# Patient Record
Sex: FEMALE | Race: WHITE | NOT HISPANIC OR LATINO | URBAN - METROPOLITAN AREA
[De-identification: names, ages, dates, MRNs, and addresses within clinical notes are randomized per-mention and may not be internally consistent; named-entity substitution may affect disease eponyms.]

---

## 2018-07-02 ENCOUNTER — IMPORTED ENCOUNTER (OUTPATIENT)
Dept: URBAN - METROPOLITAN AREA CLINIC 50 | Facility: CLINIC | Age: 73
End: 2018-07-02

## 2018-07-05 ENCOUNTER — IMPORTED ENCOUNTER (OUTPATIENT)
Dept: URBAN - METROPOLITAN AREA CLINIC 50 | Facility: CLINIC | Age: 73
End: 2018-07-05

## 2018-07-09 ENCOUNTER — IMPORTED ENCOUNTER (OUTPATIENT)
Dept: URBAN - METROPOLITAN AREA CLINIC 50 | Facility: CLINIC | Age: 73
End: 2018-07-09

## 2018-08-07 ENCOUNTER — IMPORTED ENCOUNTER (OUTPATIENT)
Dept: URBAN - METROPOLITAN AREA CLINIC 50 | Facility: CLINIC | Age: 73
End: 2018-08-07

## 2018-08-09 ENCOUNTER — IMPORTED ENCOUNTER (OUTPATIENT)
Dept: URBAN - METROPOLITAN AREA CLINIC 50 | Facility: CLINIC | Age: 73
End: 2018-08-09

## 2018-08-14 ENCOUNTER — IMPORTED ENCOUNTER (OUTPATIENT)
Dept: URBAN - METROPOLITAN AREA CLINIC 50 | Facility: CLINIC | Age: 73
End: 2018-08-14

## 2018-08-14 NOTE — PATIENT DISCUSSION
"""S/P IOL OD: Tecnis [id number] +25.0 @ 57Âº (Target: -0.25) +ORA/Femto/Arcs +TM. Continue post operative instructions and drops per schedule.  """

## 2018-08-23 ENCOUNTER — IMPORTED ENCOUNTER (OUTPATIENT)
Dept: URBAN - METROPOLITAN AREA CLINIC 50 | Facility: CLINIC | Age: 73
End: 2018-08-23

## 2018-08-23 NOTE — PATIENT DISCUSSION
"""S/P IOL OD: Tecnis [id number] +25.0 @ 57Âº (Target: -0.25) +ORA/Femto/Arcs +TM. Continue post operative instructions and drops per schedule.  """

## 2018-09-17 ENCOUNTER — IMPORTED ENCOUNTER (OUTPATIENT)
Dept: URBAN - METROPOLITAN AREA CLINIC 50 | Facility: CLINIC | Age: 73
End: 2018-09-17

## 2018-09-17 NOTE — PATIENT DISCUSSION
"""S/P IOL OD: Tecnis [id number] +25.0 @ 57Âº (Target: -0.25) +ORA/Femto/Arcs +TM. Continue post operative instructions and drops per schedule.  """

## 2018-09-18 ENCOUNTER — IMPORTED ENCOUNTER (OUTPATIENT)
Dept: URBAN - METROPOLITAN AREA CLINIC 50 | Facility: CLINIC | Age: 73
End: 2018-09-18

## 2018-10-04 ENCOUNTER — IMPORTED ENCOUNTER (OUTPATIENT)
Dept: URBAN - METROPOLITAN AREA CLINIC 50 | Facility: CLINIC | Age: 73
End: 2018-10-04

## 2018-10-04 NOTE — PATIENT DISCUSSION
"""S/P IOL OD: Tecnis [id number] +25.0 @ 57Âº (Target: -0.25) +ORA/Femto/Arcs +TM. Continue post operative instructions and drops per schedule.  """

## 2018-10-09 ENCOUNTER — IMPORTED ENCOUNTER (OUTPATIENT)
Dept: URBAN - METROPOLITAN AREA CLINIC 50 | Facility: CLINIC | Age: 73
End: 2018-10-09

## 2018-10-09 NOTE — PATIENT DISCUSSION
"""S/P IOL OS: Tecnis [id number] +25.0 (Target: -0.25) +Femto/Arcs +Omidria. Continue post operative instructions and drops per schedule.  """

## 2018-10-10 ENCOUNTER — IMPORTED ENCOUNTER (OUTPATIENT)
Dept: URBAN - METROPOLITAN AREA CLINIC 50 | Facility: CLINIC | Age: 73
End: 2018-10-10

## 2018-10-11 ENCOUNTER — IMPORTED ENCOUNTER (OUTPATIENT)
Dept: URBAN - METROPOLITAN AREA CLINIC 50 | Facility: CLINIC | Age: 73
End: 2018-10-11

## 2018-10-19 ENCOUNTER — IMPORTED ENCOUNTER (OUTPATIENT)
Dept: URBAN - METROPOLITAN AREA CLINIC 50 | Facility: CLINIC | Age: 73
End: 2018-10-19

## 2018-11-08 ENCOUNTER — IMPORTED ENCOUNTER (OUTPATIENT)
Dept: URBAN - METROPOLITAN AREA CLINIC 50 | Facility: CLINIC | Age: 73
End: 2018-11-08

## 2019-03-15 ENCOUNTER — IMPORTED ENCOUNTER (OUTPATIENT)
Dept: URBAN - METROPOLITAN AREA CLINIC 50 | Facility: CLINIC | Age: 74
End: 2019-03-15

## 2019-03-15 NOTE — PATIENT DISCUSSION
"""Follow dry ARMD without treatment. MVI/AREDS/Amsler. Patient to call if vision changes or distortion increases.  Good diet/do not smoke."" ""Start AREDS 2 vitamins ."""

## 2019-07-19 ENCOUNTER — IMPORTED ENCOUNTER (OUTPATIENT)
Dept: URBAN - METROPOLITAN AREA CLINIC 50 | Facility: CLINIC | Age: 74
End: 2019-07-19

## 2020-08-24 ENCOUNTER — IMPORTED ENCOUNTER (OUTPATIENT)
Dept: URBAN - METROPOLITAN AREA CLINIC 50 | Facility: CLINIC | Age: 75
End: 2020-08-24

## 2020-08-24 NOTE — PATIENT DISCUSSION
"""Follow dry ARMD without treatment. MVI/AREDS/Moshe. Patient to call if vision changes or distortion increases.  Good diet/do not smoke."" ""OCT Macula: OD: Stable

## 2021-02-09 NOTE — PATIENT DISCUSSION
Surgery Counseling: I have discussed the option of glasses versus cataract surgery versus following. It was explained that when the patients vision no longer meets their visual needs and a glasses prescription does not improve visual symptoms, the option of cataract surgery is a reasonable next step. After this discussion, the patient desires to consult with Dr. Francine Rodriguez for possible cataract surgery.

## 2021-02-09 NOTE — PATIENT DISCUSSION
REFRACTIVE ERROR, OU - DISCUSSED OPTION OF CORRECTING AT THE TIME OF CATARACT SURGERY. PT UNDERSTANDS AND ELECTS TO CONSIDER THEIR OPTIONS. FOLLOW UP WITH DR. GREEN FOR TESTING AND SURGERY PLANNING.

## 2021-02-09 NOTE — PATIENT DISCUSSION
The RxSight Light Adjustable Lens (RxLAL) is similar to other intraocular lenses (IOLs) that can be implanted in the eye to replace the natural lens that is removed during cataract surgery. While all IOLs improve vision after cataract surgery, most patients will require glasses (or contact lenses) to improve their vision to the level required for driving or reading. The RxLAL reduces the need for glasses or contact lenses by being able to change its focusing power after it is implanted in the eye. The focusing power of the RxLAL is adjusted by specific patterns of ultraviolet (UV) light produced by the RxSight Light Delivery Device (LDD), an instrument that the doctor uses in the office beginning 2-3 weeks after cataract surgery. Up to three light adjustment treatments can be performed to improve the patients vision, with a separation of 3 days between treatments. When the patient and doctor are satisfied with the vision, the same LDD is used to lockin the RxLAL and make the prescription permanent. From immediately after surgery until 24 hours after the completion of the lockin treatment, it was discussed that the need to protect the RxLAL from UV light in the environment by wearing ALISHA specific protective eyewear provided during all waking hours.

## 2021-02-09 NOTE — PATIENT DISCUSSION
[phone]&nbsp; &nbsp; &nbsp; alr

## 2021-02-09 NOTE — PATIENT DISCUSSION
DUE TO MATURE CATARACT OS, DISCUSSED BENEFIT FROM ALISHA DUE TO FLEXIBILITY OF ADJUSTING THE IOL UP TO 3 TIMES AFTER SURGERY. PATIENT UNDERSTANDS THE NEED FOR FULL TIME UV PROTECTIVE GLASSES UNTIL 24 HOURS AFTER THE IOL IS LOCKED.

## 2021-02-09 NOTE — PATIENT DISCUSSION
Continue: prednisol ace-gatiflox-bromfen (prednisol ace-gatiflox-bromfen): drops,suspension: 1-0.5-0.075% 1 drop three times a day [facility]

## 2021-02-16 NOTE — PATIENT DISCUSSION
Continue: prednisol ace-gatiflox-bromfen (prednisol ace-gatiflox-bromfen): drops,suspension: 1-0.5-0.075% 1 drop three times a day as directed [facility]

## 2021-02-16 NOTE — PATIENT DISCUSSION
Surgery Counseling: I have discussed the option of glasses versus cataract surgery versus following. It was explained that when the patients vision no longer meets their visual needs and a glasses prescription does not improve visual symptoms, the option of cataract surgery is a reasonable next step. It was explained that there is no guarantee that removing the cataract will improve their visual symptoms, however; it is believed that the cataract is contributing to the patient's visual impairment and surgery may improve both the visual and functional status of the patient. The risks, benefits and alternatives of surgery were discussed with the patient. After this discussion, the patient desires to proceed with cataract surgery with implantation of an intraocular lens to improve vision .

## 2021-02-16 NOTE — PATIENT DISCUSSION
DISCUSSED BENEFIT FROM ALISHA DUE TO FLEXIBILITY OF ADJUSTING THE IOL UP TO 3 TIMES AFTER SURGERY. PATIENT UNDERSTANDS THE NEED FOR FULL TIME UV PROTECTIVE GLASSES UNTIL 24 HOURS AFTER THE IOL IS LOCKED.

## 2021-02-16 NOTE — PATIENT DISCUSSION
RECOMMEND THE ALISHA DUE TO DENSE CATARACTS OS&gt;OD. WILL SET BOTH EYES FOR DISTANCE VISION.  COULD CONSIDER MINI MONOVISION

## 2021-02-25 NOTE — PATIENT DISCUSSION
***This patient had refractive cataract surgery performed. An IOL was placed to achieve a target refraction of plano (which should provide them with satisfactory vision with the possible aid of glasses/contact lenses prn). ***

## 2021-02-25 NOTE — PATIENT DISCUSSION
Continue: prednisol ace-gatiflox-bromfen (prednisol ace-gatiflox-bromfen): drops,suspension: 1-0.5-0.075% 1 drop three times a day as directed [facility]

## 2021-03-04 NOTE — PATIENT DISCUSSION
Continue: prednisol ace-gatiflox-bromfen (prednisol ace-gatiflox-bromfen): drops,suspension: 1-0.5-0.075% 1 drop three times a day as directed [facility]

## 2021-03-11 NOTE — PATIENT DISCUSSION
Continue: prednisol ace-gatiflox-bromfen (prednisol ace-gatiflox-bromfen): drops,suspension: 1-0.5-0.075% 1 drop three times a day [facility]

## 2021-03-22 NOTE — PATIENT DISCUSSION
Continue: prednisol ace-gatiflox-bromfen (prednisol ace-gatiflox-bromfen): drops,suspension: 1-0.5-0.075% 1 drop three times a day as [facility]

## 2021-03-22 NOTE — PATIENT DISCUSSION
PATIENT ELECTS TO PROCEED WITH HER FIRST LIGHT TREATMENT OU. WOULD LIKE TO TRIAL MONOVISION (OD DIST / OS NEAR).

## 2021-03-24 NOTE — PATIENT DISCUSSION
Continue: prednisol ace-gatiflox-bromfen (prednisol ace-gatiflox-bromfen): drops,suspension: 1-0.5-0.075% 1 drop three times a day as [facility]

## 2021-03-24 NOTE — PATIENT DISCUSSION
PT IS HAPPY WITH DISTANCE VISION IN THE RIGHT EYE AND READY TO PROCEED WITH LOCK IN #1 OD WITH GOAL OF PLANO.

## 2021-03-24 NOTE — PATIENT DISCUSSION
PT IS HAPPY WITH NEAR VISION IN THE LEFT EYE AND READY TO PROCEED WITH TREATMENT #2 TODAY WITH A GOAL OF -1.50.

## 2021-03-29 NOTE — PATIENT DISCUSSION
Continue: prednisol ace-gatiflox-bromfen (prednisol ace-gatiflox-bromfen): drops,suspension: 1-0.5-0.075% 1 drop three times a day as directed [facility]

## 2021-03-29 NOTE — PATIENT DISCUSSION
TRIAL FRAMED MONOVISION WITH +0.50 MORE OS AND PATIENT APPRECIATES IMPROVEMENT IN NEAR. OFFERED THIRD TREATMENT VS LOCK-IN AND PATIENT ELECTS TO PROCEED WITH 3RD TREATMENT OS TODAY TO IMPROVE NEAR.

## 2021-03-29 NOTE — PATIENT DISCUSSION
3RD TREATMENT OS DONE TODAY. LOCK-IN AVAILABLE IF PATIENT HAPPY WITH VISION AT NEXT VISIT. RETURN AS SCHEDULED.

## 2021-04-01 NOTE — PATIENT DISCUSSION
OS LOCKED IN TODAY. ALL TREATMENT COMPLETED OU. PATIENT ED MUST WEAR UV GLS FOR 24 HRS MORE AND THEN CAN DISCARD.

## 2021-04-01 NOTE — PATIENT DISCUSSION
Continue: prednisol ace-gatiflox-bromfen (prednisol ace-gatiflox-bromfen): drops,suspension: 1-0.5-0.075% 1 drop three times a day [facility]

## 2021-04-17 ASSESSMENT — VISUAL ACUITY
OD_CC: J1+
OS_BAT: 20/50
OS_SC: 20/25
OS_CC: J1+
OD_OTHER: 20/70. 20/200.
OS_CC: J1@ 12 IN
OS_OTHER: 20/50. 20/50.
OS_SC: 20/20
OS_OTHER: 20/50. 20/50.
OS_BAT: 20/50
OD_PH: 20/60
OD_BAT: 20/40
OS_BAT: 20/50
OS_CC: 20/20-1
OS_CC: J1+
OD_SC: 20/20
OD_SC: 20/25
OD_CC: J1@ 12 IN
OS_OTHER: 20/40. 20/40.
OD_CC: J1+
OS_CC: 20/70
OS_OTHER: 20/50. 20/80.
OD_BAT: 20/70
OD_PH: 20/50+2
OS_SC: 20/25-1
OD_SC: 20/25
OD_CC: J1+
OD_CC: J3
OS_BAT: 20/40
OS_CC: 20/20
OD_SC: 20/40-
OD_SC: 20/20-1
OS_BAT: 20/50
OD_CC: 20/60-1
OS_SC: 20/25-2
OS_CC: J1+
OS_OTHER: 20/50. 20/70.
OS_CC: 20/25
OD_SC: 20/20-
OD_BAT: 20/70
OD_SC: 20/25
OD_OTHER: 20/70. 20/200.
OD_SC: 20/20-1
OS_CC: J1+
OD_CC: 20/70
OD_CC: J1+
OS_PH: 20/50
OS_SC: 20/20
OD_SC: 20/25
OS_SC: 20/200
OS_CC: J3
OS_SC: 20/100
OD_OTHER: 20/40. 20/50.

## 2021-04-17 ASSESSMENT — TONOMETRY
OS_IOP_MMHG: 20
OD_IOP_MMHG: 13
OS_IOP_MMHG: 16
OD_IOP_MMHG: 12
OD_IOP_MMHG: 12
OS_IOP_MMHG: 16
OD_IOP_MMHG: 16
OS_IOP_MMHG: 10
OS_IOP_MMHG: 14
OD_IOP_MMHG: 12
OS_IOP_MMHG: 22
OD_IOP_MMHG: 15
OS_IOP_MMHG: 13
OD_IOP_MMHG: 17
OD_IOP_MMHG: 24
OS_IOP_MMHG: 12
OD_IOP_MMHG: 16
OS_IOP_MMHG: 17
OD_IOP_MMHG: 15
OD_IOP_MMHG: 14
OS_IOP_MMHG: 14
OS_IOP_MMHG: 11
OD_IOP_MMHG: 12
OS_IOP_MMHG: 15
OD_IOP_MMHG: 11
OD_IOP_MMHG: 25
OS_IOP_MMHG: 12
OD_IOP_MMHG: 13
OD_IOP_MMHG: 12
OS_IOP_MMHG: 17
OS_IOP_MMHG: 16
OS_IOP_MMHG: 14
OS_IOP_MMHG: 14
OS_IOP_MMHG: 12
OD_IOP_MMHG: 13
OS_IOP_MMHG: 14
OS_IOP_MMHG: 16
OD_IOP_MMHG: 13
OD_IOP_MMHG: 14
OS_IOP_MMHG: 14
OD_IOP_MMHG: 13
OD_IOP_MMHG: 15

## 2021-04-17 ASSESSMENT — PACHYMETRY
OS_CT_UM: 577
OS_CT_UM: 577
OD_CT_UM: 574
OS_CT_UM: 577
OD_CT_UM: 574
OD_CT_UM: 574
OS_CT_UM: 577
OD_CT_UM: 574
OD_CT_UM: 574
OS_CT_UM: 577
OD_CT_UM: 574
OS_CT_UM: 577
OD_CT_UM: 574
OD_CT_UM: 574
OS_CT_UM: 577
OS_CT_UM: 577
OD_CT_UM: 574
OS_CT_UM: 577

## 2021-04-22 NOTE — PATIENT DISCUSSION
POSTERIOR CAPSULAR FIBROSIS/OPACIFICATION, OU - NOT VISUALLY SIGNIFICANT.   RE-EVALUATE IN 1-2 MONTHS WITH DFE.

## 2021-04-22 NOTE — PATIENT DISCUSSION
S/P PC IOL (ALISHA), OU - DOING WELL, DISCUSSED MONOVISION NEAR EYE IS TARGETED FOR INTERMEDIATE AND MAY STILL NEEDS READERS FOR SMALL PRINT AND OTHER NEAR WORK IF HOLDING CLOSE. FOLLOW.

## 2021-08-16 ENCOUNTER — PREPPED CHART (OUTPATIENT)
Dept: URBAN - METROPOLITAN AREA CLINIC 49 | Facility: CLINIC | Age: 76
End: 2021-08-16

## 2021-10-11 ENCOUNTER — ANNUAL COMPREHENSIVE EXAM (OUTPATIENT)
Dept: URBAN - METROPOLITAN AREA CLINIC 49 | Facility: CLINIC | Age: 76
End: 2021-10-11

## 2021-10-11 DIAGNOSIS — H26.493: ICD-10-CM

## 2021-10-11 DIAGNOSIS — H35.3131: ICD-10-CM

## 2021-10-11 DIAGNOSIS — H16.223: ICD-10-CM

## 2021-10-11 DIAGNOSIS — H35.373: ICD-10-CM

## 2021-10-11 PROCEDURE — 92134 CPTRZ OPH DX IMG PST SGM RTA: CPT

## 2021-10-11 PROCEDURE — 92014 COMPRE OPH EXAM EST PT 1/>: CPT

## 2021-10-11 ASSESSMENT — VISUAL ACUITY
OD_GLARE: 20/30
OD_CC: 20/20
OS_GLARE: 20/30
OS_CC: 20/20-1
OS_GLARE: 20/40
OD_GLARE: 20/30
OU_CC: 20/20
OU_CC: J1+

## 2021-10-11 ASSESSMENT — TONOMETRY
OD_IOP_MMHG: 16
OS_IOP_MMHG: 15
OD_IOP_MMHG: 15
OS_IOP_MMHG: 17

## 2022-10-17 ENCOUNTER — COMPREHENSIVE EXAM (OUTPATIENT)
Dept: URBAN - METROPOLITAN AREA CLINIC 49 | Facility: CLINIC | Age: 77
End: 2022-10-17

## 2022-10-17 DIAGNOSIS — H35.373: ICD-10-CM

## 2022-10-17 DIAGNOSIS — H02.831: ICD-10-CM

## 2022-10-17 DIAGNOSIS — H35.3131: ICD-10-CM

## 2022-10-17 DIAGNOSIS — H16.223: ICD-10-CM

## 2022-10-17 DIAGNOSIS — H02.834: ICD-10-CM

## 2022-10-17 DIAGNOSIS — H26.493: ICD-10-CM

## 2022-10-17 DIAGNOSIS — H02.403: ICD-10-CM

## 2022-10-17 PROCEDURE — 66821 AFTER CATARACT LASER SURGERY: CPT

## 2022-10-17 PROCEDURE — 92134 CPTRZ OPH DX IMG PST SGM RTA: CPT

## 2022-10-17 PROCEDURE — 92014 COMPRE OPH EXAM EST PT 1/>: CPT

## 2022-10-17 ASSESSMENT — VISUAL ACUITY
OS_GLARE: 20/100
OD_SC: 20/80-1
OD_CC: J1@16"
OD_GLARE: 20/60
OS_GLARE: 20/70
OS_CC: J3@16"
OS_CC: 20/30-1
OS_SC: J10@16"
OD_PH: 20/50
OD_CC: 20/70
OS_SC: 20/30
OD_GLARE: 20/80
OD_SC: J5@16"

## 2022-10-17 ASSESSMENT — TONOMETRY
OD_IOP_MMHG: 15
OD_IOP_MMHG: 14
OS_IOP_MMHG: 15
OS_IOP_MMHG: 13

## 2022-10-17 NOTE — PROCEDURE NOTE: CLINICAL
PROCEDURE NOTE: YAG Capsulotomy OD. Diagnosis: Posterior Capsular Opacification (PCO). Prep: Mydriacil 1% and Phenylephrine 2.5%. Prior to treatment, the risks/benefits/alternatives were discussed. The patient wished to proceed with procedure. Consent was signed. Proparacaine and brominidine were placed into the operative eye after the eye was dilated. Power = 3.0mJ. Number of pulses = 8. Patient tolerated procedure well and there were no complications. Post Laser instructions given. Marc Butcher

## 2022-10-17 NOTE — PATIENT DISCUSSION
yag cap OD done today in office. Patient advised that she may notice floaters after but this is normal. F/U in 1 week for yag cap OS.

## 2022-10-17 NOTE — PATIENT DISCUSSION
PCO ([id number]): Visually significant PCO present on exam today. Recommend YAG laser capsulotomy to improve vision and decrease glare symptoms. RBAs of procedure discussed. Patient agrees and wishes to proceed.

## 2022-10-24 ENCOUNTER — CLINIC PROCEDURE ONLY (OUTPATIENT)
Dept: URBAN - METROPOLITAN AREA CLINIC 49 | Facility: CLINIC | Age: 77
End: 2022-10-24

## 2022-10-24 DIAGNOSIS — H26.492: ICD-10-CM

## 2022-10-24 PROCEDURE — 66821 AFTER CATARACT LASER SURGERY: CPT

## 2022-10-24 ASSESSMENT — VISUAL ACUITY
OD_PH: 20/30
OD_CC: 20/80
OS_CC: 20/30

## 2022-10-24 ASSESSMENT — TONOMETRY
OS_IOP_MMHG: 14
OD_IOP_MMHG: 13
OD_IOP_MMHG: 14
OS_IOP_MMHG: 12

## 2022-10-24 NOTE — PATIENT DISCUSSION
PCO ([id number]): Visually significant PCO present on exam today. Recommend YAG laser capsulotomy to improve vision and decrease glare symptoms. RBAs of procedure discussed. Patient agrees and wishes to proceed.

## 2022-10-24 NOTE — PATIENT DISCUSSION
yag cap OS done today in office. Patient advised that she may notice floaters after but this is normal. F/U 4 weeks with with Dr. Loyd Robledo.

## 2022-10-24 NOTE — PROCEDURE NOTE: CLINICAL
PROCEDURE NOTE: YAG Capsulotomy OS. Diagnosis: Posterior Capsular Opacification (PCO). Prep: Mydriacil 1% and Phenylephrine 2.5%. Prior to treatment, the risks/benefits/alternatives were discussed. The patient wished to proceed with procedure. Consent was signed. Proparacaine and brominidine were placed into the operative eye after the eye was dilated. Power = 3.0mJ. Number of pulses = 15. Patient tolerated procedure well and there were no complications. Post Laser instructions given. Ela Engel

## 2022-11-14 ENCOUNTER — POST-OP (OUTPATIENT)
Dept: URBAN - METROPOLITAN AREA CLINIC 49 | Facility: CLINIC | Age: 77
End: 2022-11-14

## 2022-11-14 DIAGNOSIS — Z98.890: ICD-10-CM

## 2022-11-14 DIAGNOSIS — H02.831: ICD-10-CM

## 2022-11-14 DIAGNOSIS — H02.834: ICD-10-CM

## 2022-11-14 DIAGNOSIS — H02.403: ICD-10-CM

## 2022-11-14 PROCEDURE — 92015 DETERMINE REFRACTIVE STATE: CPT

## 2022-11-14 PROCEDURE — 99024 POSTOP FOLLOW-UP VISIT: CPT

## 2022-11-14 ASSESSMENT — VISUAL ACUITY
OD_CC: 20/60
OS_CC: 20/25-1
OD_PH: 20/25

## 2022-11-14 ASSESSMENT — TONOMETRY
OS_IOP_MMHG: 16
OD_IOP_MMHG: 16
OD_IOP_MMHG: 15
OS_IOP_MMHG: 14

## 2022-12-28 ENCOUNTER — DIAGNOSTICS ONLY (OUTPATIENT)
Dept: URBAN - METROPOLITAN AREA CLINIC 49 | Facility: CLINIC | Age: 77
End: 2022-12-28

## 2022-12-28 PROCEDURE — 92285 EXTERNAL OCULAR PHOTOGRAPHY: CPT

## 2022-12-28 PROCEDURE — 92082 INTERMEDIATE VISUAL FIELD XM: CPT

## 2023-01-25 ENCOUNTER — CONSULTATION/EVALUATION (OUTPATIENT)
Dept: URBAN - METROPOLITAN AREA CLINIC 49 | Facility: CLINIC | Age: 78
End: 2023-01-25

## 2023-01-25 DIAGNOSIS — H02.831: ICD-10-CM

## 2023-01-25 DIAGNOSIS — H02.834: ICD-10-CM

## 2023-01-25 PROCEDURE — 92012 INTRM OPH EXAM EST PATIENT: CPT

## 2023-01-25 ASSESSMENT — VISUAL ACUITY
OD_CC: 20/30
OS_CC: 20/25
OD_PH: 20/25
OU_CC: 20/25

## 2023-01-25 ASSESSMENT — TONOMETRY
OS_IOP_MMHG: 16
OD_IOP_MMHG: 18
OS_IOP_MMHG: 18
OD_IOP_MMHG: 17

## 2023-11-20 ENCOUNTER — COMPREHENSIVE EXAM (OUTPATIENT)
Dept: URBAN - METROPOLITAN AREA CLINIC 49 | Facility: LOCATION | Age: 78
End: 2023-11-20

## 2023-11-20 DIAGNOSIS — H16.223: ICD-10-CM

## 2023-11-20 DIAGNOSIS — H02.831: ICD-10-CM

## 2023-11-20 DIAGNOSIS — H35.373: ICD-10-CM

## 2023-11-20 DIAGNOSIS — H35.3131: ICD-10-CM

## 2023-11-20 DIAGNOSIS — H02.834: ICD-10-CM

## 2023-11-20 DIAGNOSIS — H52.4: ICD-10-CM

## 2023-11-20 PROCEDURE — 99214 OFFICE O/P EST MOD 30 MIN: CPT

## 2023-11-20 PROCEDURE — 92015 DETERMINE REFRACTIVE STATE: CPT

## 2023-11-20 PROCEDURE — 92134 CPTRZ OPH DX IMG PST SGM RTA: CPT

## 2023-11-20 ASSESSMENT — VISUAL ACUITY
OU_CC: 20/20
OU_CC: J1+
OS_SC: 20/25
OS_CC: 20/25
OD_CC: J1+/-
OD_SC: 20/25
OU_SC: 20/20
OS_CC: J1+/-
OD_CC: 20/25

## 2023-11-20 ASSESSMENT — TONOMETRY
OS_IOP_MMHG: 14
OD_IOP_MMHG: 13
OD_IOP_MMHG: 14
OS_IOP_MMHG: 12

## 2024-11-25 ENCOUNTER — COMPREHENSIVE EXAM (OUTPATIENT)
Dept: URBAN - METROPOLITAN AREA CLINIC 49 | Facility: LOCATION | Age: 79
End: 2024-11-25

## 2024-11-25 DIAGNOSIS — H16.223: ICD-10-CM

## 2024-11-25 DIAGNOSIS — H35.373: ICD-10-CM

## 2024-11-25 DIAGNOSIS — H52.4: ICD-10-CM

## 2024-11-25 DIAGNOSIS — H35.3131: ICD-10-CM

## 2024-11-25 PROCEDURE — 99214 OFFICE O/P EST MOD 30 MIN: CPT

## 2024-11-25 PROCEDURE — 92015 DETERMINE REFRACTIVE STATE: CPT

## 2024-11-25 PROCEDURE — 92134 CPTRZ OPH DX IMG PST SGM RTA: CPT
